# Patient Record
Sex: FEMALE | Race: WHITE
[De-identification: names, ages, dates, MRNs, and addresses within clinical notes are randomized per-mention and may not be internally consistent; named-entity substitution may affect disease eponyms.]

---

## 2019-09-26 ENCOUNTER — HOSPITAL ENCOUNTER (EMERGENCY)
Dept: HOSPITAL 25 - UCCORT | Age: 34
Discharge: HOME | End: 2019-09-26
Payer: COMMERCIAL

## 2019-09-26 VITALS — DIASTOLIC BLOOD PRESSURE: 62 MMHG | SYSTOLIC BLOOD PRESSURE: 132 MMHG

## 2019-09-26 DIAGNOSIS — Z3A.24: ICD-10-CM

## 2019-09-26 DIAGNOSIS — J40: Primary | ICD-10-CM

## 2019-09-26 DIAGNOSIS — O99.512: ICD-10-CM

## 2019-09-26 PROCEDURE — G0463 HOSPITAL OUTPT CLINIC VISIT: HCPCS

## 2019-09-26 PROCEDURE — 99212 OFFICE O/P EST SF 10 MIN: CPT

## 2019-09-26 NOTE — UC
Throat Pain/Nasal Chang HPI





- HPI Summary


HPI Summary: 





35 yo  at 24 weeks, with a 6 day hx of sore throat progressing to cough and 

congestion with mild shortness of breath and malaise. 





- History of Current Complaint


Chief Complaint: UCRespiratory


Stated Complaint: LIGHTHEADED, COUGH(24WEEKS)


Time Seen by Provider: 19 15:26


Hx Obtained From: Patient


Hx Last Menstrual Period: 4/10/19


Onset/Duration: Gradual Onset, Lasting Days - 6


Severity: Moderate


Pain Intensity: 4


Cough: Nonproductive


Associated Signs & Symptoms: Positive: Wheezing, Fever - low grade





- Epiglottits Risk Factors


Epiglottis Risk Factors: Negative





- Allergies/Home Medications


Allergies/Adverse Reactions: 


 Allergies











Allergy/AdvReac Type Severity Reaction Status Date / Time


 


No Known Allergies Allergy   Verified 19 14:37











Home Medications: 


 Home Medications





Acetaminophen [Pain Relief Extra Strength] 1,000 mg PO PRN 19 [History]


Omeprazole 20 mg PO DAILY 19 [History Confirmed 19]


Prenatal Vit37/Iron/Folic Acid [Prenata] 1 chw PO DAILY 19 [History 

Confirmed 19]











PMH/Surg Hx/FS Hx/Imm Hx


Previously Healthy: Yes - 2 healthy prenancies, delivered at term





- Surgical History


Surgical History: Yes


Surgery Procedure, Year, and Place: CHOLYCYSTECTOMY 2016





- Family History


Known Family History: Positive: Non-Contributory





- Social History


Occupation: Employed Part-time


Lives: With Family


Alcohol Use: None


Substance Use Type: None


Smoking Status (MU): Never Smoked Tobacco





Review of Systems


All Other Systems Reviewed And Are Negative: Yes


Constitutional: Positive: Fever, Fatigue


ENT: Positive: Sinus Congestion


Respiratory: Positive: Shortness Of Breath, Cough


Cardiovascular: Negative: Palpitations, Chest Pain


Gastrointestinal: Negative: Abdominal Pain


Genitourinary: Positive: Negative


Motor: Positive: Negative


Neurovascular: Positive: Negative


Musculoskeletal: Positive: Negative


Neurological: Positive: Headache


Psychological: Positive: Negative


Is Patient Immunocompromised?: Yes





Physical Exam


Triage Information Reviewed: Yes


Appearance: No Pain Distress, Ill-Appearing - looks fatigued


Vital Signs: 


 Initial Vital Signs











Temp  99.3 F   19 14:40


 


Pulse  98   19 14:40


 


Resp  20   19 14:40


 


BP  141/66   19 14:40


 


Pulse Ox  100   19 14:40











Eyes: Positive: Conjunctiva Clear


ENT: Positive: Pharynx normal, TMs normal


Respiratory: Positive: Decreased breath sounds, Rhonchi - coarse breath sounds 

with scattered wheezes both lung fields.


Cardiovascular: Positive: RRR, No Murmur


Abdomen Description: Positive: Nontender, No Organomegaly, Soft - gravid uterus

, soft.


Musculoskeletal Exam: Normal


Musculoskeletal: Positive: No Edema


Neurological: Positive: Alert


Psychological Exam: Normal


Skin Exam: Normal





Throat Pain/Nasal Course/Dx





- Course


Course Of Treatment: 





azithromycin for bronchitis tx, requests antifungal for anticipated yeast 

vaginitis





- Differential Dx/Diagnosis


Differential Diagnosis/HQI/PQRI: Sinusitis, Tonsillitis, Other - bronchitis.


Provider Diagnosis: 


 Bronchitis








Discharge ED





- Sign-Out/Discharge


Documenting (check all that apply): Patient Departure


All imaging exams completed and their final reports reviewed: No Studies





- Discharge Plan


Condition: Stable


Disposition: HOME


Prescriptions: 


Azithromyxin BROOK (NF) [Z-Brook (Zithromax) 250 mg tabs #6] 2 tab PO .TODAY, THEN 

1 DAILY #6 tab


Terconazole 20 gm VG DAILY #60 cream.[email or web address]


Patient Education Materials:  Acute Bronchitis (ED)


Forms:  *Work Release


Referrals: 


Karin Singh [Primary Care Provider] - 


Additional Instructions: 


Repeat blood pressure reading today was 132 systolic--you will have this re-

checked at your next prenatal visi


Increase rest, ensure high intake of fluids, and take azithromycin for 

treatment  of bronchitis. 


Follow up if you are not seeing improvement in 3 or 4 days. 


I have sent in a prescription for vaginal terconazole should you develop a 

yeast infection. 





- Billing Disposition and Condition


Condition: STABLE


Disposition: Home